# Patient Record
Sex: FEMALE | Race: WHITE | Employment: OTHER | URBAN - METROPOLITAN AREA
[De-identification: names, ages, dates, MRNs, and addresses within clinical notes are randomized per-mention and may not be internally consistent; named-entity substitution may affect disease eponyms.]

---

## 2024-07-01 ENCOUNTER — TELEPHONE (OUTPATIENT)
Dept: OTOLARYNGOLOGY | Facility: CLINIC | Age: 74
End: 2024-07-01

## 2024-07-01 DIAGNOSIS — H81.02 MENIERE'S DISEASE OF LEFT EAR: ICD-10-CM

## 2024-07-01 RX ORDER — MONTELUKAST SODIUM 10 MG/1
TABLET ORAL
COMMUNITY

## 2024-07-01 RX ORDER — ROSUVASTATIN CALCIUM 10 MG/1
TABLET, COATED ORAL
COMMUNITY

## 2024-07-01 RX ORDER — TRIAMTERENE/HYDROCHLOROTHIAZID 37.5-25 MG
1 TABLET ORAL DAILY
Qty: 90 TABLET | Refills: 3 | Status: SHIPPED | OUTPATIENT
Start: 2024-07-01 | End: 2025-06-26

## 2024-07-01 RX ORDER — TRIAMTERENE/HYDROCHLOROTHIAZID 37.5-25 MG
1 TABLET ORAL DAILY
COMMUNITY
Start: 2022-04-05

## 2024-07-01 RX ORDER — LOSARTAN POTASSIUM 100 MG/1
TABLET ORAL
COMMUNITY

## 2024-07-01 RX ORDER — BUDESONIDE AND FORMOTEROL FUMARATE DIHYDRATE 160; 4.5 UG/1; UG/1
AEROSOL RESPIRATORY (INHALATION)
COMMUNITY

## 2024-07-01 RX ORDER — AMLODIPINE BESYLATE 5 MG/1
TABLET ORAL
COMMUNITY

## 2024-07-01 RX ORDER — OMEPRAZOLE 20 MG/1
CAPSULE, DELAYED RELEASE ORAL
COMMUNITY

## 2024-07-01 NOTE — TELEPHONE ENCOUNTER
Patient called needing refill on Maxzide medication for her Meniere's disease. Refill pended to Dr. Newton for singature to [facility] by Mail [facility] pharmacy. Patient will need yearly follow up visit with ENT provider with hearing test to continue medication refills.

## 2024-09-09 ENCOUNTER — APPOINTMENT (OUTPATIENT)
Dept: OTOLARYNGOLOGY | Facility: CLINIC | Age: 74
End: 2024-09-09
Payer: MEDICARE

## 2024-09-13 ENCOUNTER — APPOINTMENT (OUTPATIENT)
Dept: OTOLARYNGOLOGY | Facility: CLINIC | Age: 74
End: 2024-09-13
Payer: MEDICARE

## 2024-11-12 ENCOUNTER — APPOINTMENT (OUTPATIENT)
Dept: OTOLARYNGOLOGY | Facility: CLINIC | Age: 74
End: 2024-11-12
Payer: MEDICARE

## 2024-11-12 DIAGNOSIS — H81.02 MENIERE'S DISEASE OF LEFT EAR: ICD-10-CM

## 2024-11-12 DIAGNOSIS — H90.3 SENSORINEURAL HEARING LOSS (SNHL) OF BOTH EARS: Primary | ICD-10-CM

## 2024-11-12 DIAGNOSIS — H91.8X3 ASYMMETRICAL HEARING LOSS: ICD-10-CM

## 2024-11-12 PROCEDURE — 1159F MED LIST DOCD IN RCRD: CPT | Performed by: NURSE PRACTITIONER

## 2024-11-12 PROCEDURE — 99213 OFFICE O/P EST LOW 20 MIN: CPT | Performed by: NURSE PRACTITIONER

## 2024-11-12 PROCEDURE — 1160F RVW MEDS BY RX/DR IN RCRD: CPT | Performed by: NURSE PRACTITIONER

## 2024-11-12 NOTE — PROGRESS NOTES
Subjective   Patient ID: Mary Davis is a 74 y.o. female who presents for No chief complaint on file..    Per patient request, this was a virtual visit.  Patient [place] half away.  Verbal consent was obtained.  HPI  This patient presents for a yearly follow-up visit.  In review, she last saw Dr. Newton in May 2023.  She has a history of left-sided Ménière's disease and is managed with dietary modifications and daily Maxide.  Today, she denies any significant vertiginous episodes.  She does feel that she gets off balanced fairly frequently but [facility]  This includes walking and cardio drumming.  She has not required any Zofran for many months.  She feels that the hearing in the left ear may be somewhat decreased.  She has not had a recent audiogram.  She denies any otalgia, otorrhea.  Review of Systems  A comprehensive or 10 points review of the patient's constitutional, neurological, HEENT, pulmonary, cardiovascular and genito-urinary systems showed only those mentioned in history of present illness.    Objective   Physical Exam  Constitutional: no fever, chills, weight loss or weight gain   General appearance: Appears well, well-nourished, well groomed. No acute distress.   Communication: Normal communication   Psychiatric: Oriented to person, place and time. Normal mood and affect.   Neurologic: Cranial nerves II-XII grossly intact and symmetric bilaterally.   Head and Face:   Head: Atraumatic with no masses, lesions or scarring.   Face: Normal symmetry, no paralysis, synkinesis or facial tic. No scars or deformities.     Eyes: Conjunctiva not edematous or erythematous   Ears: External inspection of ears with no deformity, scars or masses.   Nose: External inspection of nose: No nasal lesions, lacerations or scars.   Neck: Normal appearing, symmetric, trachea midline.   Cardiovascular: Examination of peripheral vascular system shows no clubbing or cyanosis.    Respiratory: No respiratory distress increased work of breathing. Inspection of the chest with symmetric chest expansion and normal respiratory effort.   Skin: No rashes in the head or neck    My interpretation of the audiogram done 8/25/2023 is right side with normal hearing through 1000 Hz sloping to moderate sensorineural hearing loss with excellent word recognition score and normal tympanogram.  Left side with low pitch sensorineural hearing loss at 250 Hz only rising to normal through 1000 Hz sloping symmetrically to moderate sensorineural hearing loss with excellent word recognition score and normal tympanogram.  Her audiogram from 2022 showed a more significant left low pitch asymmetry.    Assessment/Plan       This patient presents for subsequent evaluation of chronic acquired bilateral/asymmetrical sensorineural hearing loss and left-sided Ménière's disease.    I am happy to hear that her symptoms are stable.  I recommended continuing her current medical management.  I also recommended getting an updated audiogram.  Patient will call to schedule this.  If there are any new concerning findings, I am happy to review over the phone.  Otherwise, she may continue to follow-up yearly with myself or Dr. Newton.  Patient is in agreement with the plan.  All questions were answered to patient's satisfaction.    This note was created using speech recognition transcription software. Despite proofreading, several typographical errors might be present that might affect the meaning of the content. Please call with any questions.           LAMAR Booth-CNP 11/12/24 8:51 AM

## 2025-07-08 DIAGNOSIS — H81.02 MENIERE'S DISEASE OF LEFT EAR: Primary | ICD-10-CM

## 2025-07-08 RX ORDER — TRIAMTERENE AND HYDROCHLOROTHIAZIDE 37.5; 25 MG/1; MG/1
1 TABLET ORAL DAILY
Qty: 90 TABLET | Refills: 3 | Status: SHIPPED | OUTPATIENT
Start: 2025-07-08